# Patient Record
Sex: MALE | Race: ASIAN | NOT HISPANIC OR LATINO
[De-identification: names, ages, dates, MRNs, and addresses within clinical notes are randomized per-mention and may not be internally consistent; named-entity substitution may affect disease eponyms.]

---

## 2023-03-14 PROBLEM — Z00.00 ENCOUNTER FOR PREVENTIVE HEALTH EXAMINATION: Status: ACTIVE | Noted: 2023-03-14

## 2023-03-22 ENCOUNTER — APPOINTMENT (OUTPATIENT)
Dept: COLORECTAL SURGERY | Facility: CLINIC | Age: 38
End: 2023-03-22
Payer: COMMERCIAL

## 2023-03-22 VITALS
DIASTOLIC BLOOD PRESSURE: 90 MMHG | WEIGHT: 230 LBS | SYSTOLIC BLOOD PRESSURE: 129 MMHG | HEART RATE: 86 BPM | HEIGHT: 68 IN | BODY MASS INDEX: 34.86 KG/M2

## 2023-03-22 DIAGNOSIS — L05.91 PILONIDAL CYST W/OUT ABSCESS: ICD-10-CM

## 2023-03-22 DIAGNOSIS — L28.0 LICHEN SIMPLEX CHRONICUS: ICD-10-CM

## 2023-03-22 DIAGNOSIS — K62.9 DISEASE OF ANUS AND RECTUM, UNSPECIFIED: ICD-10-CM

## 2023-03-22 PROCEDURE — 99203 OFFICE O/P NEW LOW 30 MIN: CPT

## 2023-03-22 NOTE — HISTORY OF PRESENT ILLNESS
[FreeTextEntry1] : 36 yo male w/ no significant pmh presents for evaluation of pilonidal dx for 10 years, perianal rash and small bump.  The "bump" was first noted 4 weeks ago and has largely resolved.  It was initially tender, but is painless now.  Pt denies drainage.  Pilonidal dx noted 10 years ago and was drained once.  Pt reports drainage of blood and swelling intermittently.  Pt has note rash near the anus recently with skin irritation and surface bleeding.  No abdominal pain, no fevers or chills.  Daily BM.  No family hx of colon cancer or IBD.  No colonoscopy

## 2023-03-22 NOTE — PHYSICAL EXAM
[Abdomen Masses] : No abdominal masses [Abdomen Tenderness] : ~T No ~M abdominal tenderness [JVD] : no jugular venous distention  [Wheezing] : no wheezing was heard [Normal Rate and Rhythm] : normal rate and rhythm [No Rash or Lesion] : No rash or lesion [Alert] : alert [Oriented to Person] : oriented to person [Oriented to Place] : oriented to place [Oriented to Time] : oriented to time [Calm] : calm [de-identified] : NAD [de-identified] : EOMI [de-identified] : STEVIE

## 2023-03-22 NOTE — ASSESSMENT
[FreeTextEntry1] : Chronic pilonidal cyst, anal rash/lichenification, anal lesion resolved\par -anal lesion likely ingrown hair or thrombosed hemorrhoid that has since resolved\par -Perianal rash appears to be secondary to chronic moisture in the area.  The pt will attempt to keep the area dry with a gauze pad and will use hydrocortisone cream as needed for irriation\par -We discussed pilonidal dx and treatment options at length\par -Given only 2 active sites, I recommended pt undergo pilonidal sinus excision.  Risks and benefits of the procedure were reviewed at length including bleeding, infection and recurrence\par -all questions were answered.  Pt wishes to proceed and will schedule at his earliest convenience

## 2025-01-15 ENCOUNTER — NON-APPOINTMENT (OUTPATIENT)
Age: 40
End: 2025-01-15

## 2025-02-05 ENCOUNTER — APPOINTMENT (OUTPATIENT)
Dept: COLORECTAL SURGERY | Facility: CLINIC | Age: 40
End: 2025-02-05